# Patient Record
Sex: FEMALE | Race: OTHER
[De-identification: names, ages, dates, MRNs, and addresses within clinical notes are randomized per-mention and may not be internally consistent; named-entity substitution may affect disease eponyms.]

---

## 2018-09-18 ENCOUNTER — HOSPITAL ENCOUNTER (EMERGENCY)
Dept: HOSPITAL 62 - ER | Age: 63
Discharge: HOME | End: 2018-09-18
Payer: COMMERCIAL

## 2018-09-18 VITALS — DIASTOLIC BLOOD PRESSURE: 74 MMHG | SYSTOLIC BLOOD PRESSURE: 142 MMHG

## 2018-09-18 DIAGNOSIS — Z90.49: ICD-10-CM

## 2018-09-18 DIAGNOSIS — Z87.19: ICD-10-CM

## 2018-09-18 DIAGNOSIS — Z87.442: ICD-10-CM

## 2018-09-18 DIAGNOSIS — R19.4: ICD-10-CM

## 2018-09-18 DIAGNOSIS — R31.9: ICD-10-CM

## 2018-09-18 DIAGNOSIS — R11.0: ICD-10-CM

## 2018-09-18 DIAGNOSIS — R10.31: ICD-10-CM

## 2018-09-18 DIAGNOSIS — N39.0: Primary | ICD-10-CM

## 2018-09-18 LAB
ADD MANUAL DIFF: NO
ALBUMIN SERPL-MCNC: 5.2 G/DL (ref 3.5–5)
ALP SERPL-CCNC: 74 U/L (ref 38–126)
ALT SERPL-CCNC: 29 U/L (ref 9–52)
ANION GAP SERPL CALC-SCNC: 10 MMOL/L (ref 5–19)
APPEARANCE UR: CLEAR
APTT PPP: (no result) S
AST SERPL-CCNC: 29 U/L (ref 14–36)
BASOPHILS # BLD AUTO: 0.1 10^3/UL (ref 0–0.2)
BASOPHILS NFR BLD AUTO: 0.9 % (ref 0–2)
BILIRUB DIRECT SERPL-MCNC: 0.4 MG/DL (ref 0–0.4)
BILIRUB SERPL-MCNC: 1.1 MG/DL (ref 0.2–1.3)
BILIRUB UR QL STRIP: NEGATIVE
BUN SERPL-MCNC: 12 MG/DL (ref 7–20)
CALCIUM: 10.5 MG/DL (ref 8.4–10.2)
CHLORIDE SERPL-SCNC: 102 MMOL/L (ref 98–107)
CO2 SERPL-SCNC: 28 MMOL/L (ref 22–30)
EOSINOPHIL # BLD AUTO: 0.1 10^3/UL (ref 0–0.6)
EOSINOPHIL NFR BLD AUTO: 0.9 % (ref 0–6)
ERYTHROCYTE [DISTWIDTH] IN BLOOD BY AUTOMATED COUNT: 13.3 % (ref 11.5–14)
GLUCOSE SERPL-MCNC: 105 MG/DL (ref 75–110)
GLUCOSE UR STRIP-MCNC: NEGATIVE MG/DL
HCT VFR BLD CALC: 44.3 % (ref 36–47)
HGB BLD-MCNC: 14.8 G/DL (ref 12–15.5)
KETONES UR STRIP-MCNC: NEGATIVE MG/DL
LIPASE SERPL-CCNC: 129 U/L (ref 23–300)
LYMPHOCYTES # BLD AUTO: 1.7 10^3/UL (ref 0.5–4.7)
LYMPHOCYTES NFR BLD AUTO: 27.1 % (ref 13–45)
MCH RBC QN AUTO: 28.7 PG (ref 27–33.4)
MCHC RBC AUTO-ENTMCNC: 33.4 G/DL (ref 32–36)
MCV RBC AUTO: 86 FL (ref 80–97)
MONOCYTES # BLD AUTO: 0.3 10^3/UL (ref 0.1–1.4)
MONOCYTES NFR BLD AUTO: 5.2 % (ref 3–13)
NEUTROPHILS # BLD AUTO: 4.1 10^3/UL (ref 1.7–8.2)
NEUTS SEG NFR BLD AUTO: 65.9 % (ref 42–78)
NITRITE UR QL STRIP: NEGATIVE
PH UR STRIP: 6 [PH] (ref 5–9)
PLATELET # BLD: 171 10^3/UL (ref 150–450)
POTASSIUM SERPL-SCNC: 4.7 MMOL/L (ref 3.6–5)
PROT SERPL-MCNC: 8.8 G/DL (ref 6.3–8.2)
PROT UR STRIP-MCNC: NEGATIVE MG/DL
RBC # BLD AUTO: 5.17 10^6/UL (ref 3.72–5.28)
SODIUM SERPL-SCNC: 140.2 MMOL/L (ref 137–145)
SP GR UR STRIP: 1.01
TOTAL CELLS COUNTED % (AUTO): 100 %
UROBILINOGEN UR-MCNC: NEGATIVE MG/DL (ref ?–2)
WBC # BLD AUTO: 6.3 10^3/UL (ref 4–10.5)

## 2018-09-18 PROCEDURE — 74177 CT ABD & PELVIS W/CONTRAST: CPT

## 2018-09-18 PROCEDURE — 96375 TX/PRO/DX INJ NEW DRUG ADDON: CPT

## 2018-09-18 PROCEDURE — 80053 COMPREHEN METABOLIC PANEL: CPT

## 2018-09-18 PROCEDURE — 83690 ASSAY OF LIPASE: CPT

## 2018-09-18 PROCEDURE — 96374 THER/PROPH/DIAG INJ IV PUSH: CPT

## 2018-09-18 PROCEDURE — 85025 COMPLETE CBC W/AUTO DIFF WBC: CPT

## 2018-09-18 PROCEDURE — 96361 HYDRATE IV INFUSION ADD-ON: CPT

## 2018-09-18 PROCEDURE — 81001 URINALYSIS AUTO W/SCOPE: CPT

## 2018-09-18 PROCEDURE — 36415 COLL VENOUS BLD VENIPUNCTURE: CPT

## 2018-09-18 PROCEDURE — 99284 EMERGENCY DEPT VISIT MOD MDM: CPT

## 2018-09-18 NOTE — ER DOCUMENT REPORT
ED Medical Screen (RME)





- General


Chief Complaint: Abdominal Pain


Stated Complaint: ABDOMINAL PAIN,NAUSEA


Time Seen by Provider: 09/18/18 13:54


Notes: 





62 years old female with a history of appendectomy and right-sided 

diverticulitis status post colectomy partial, presents today with right-sided 

abdominal pain since yesterday, particularly over the right lower quadrant with 

nausea no vomiting.  Denied any diarrhea denies any dysuria frequency urgency.  

Denied any fever chills.





On examination right side of the abdomen is extremely tender particularly over 

the right lower quadrant region with guarding and rebound tenderness.  Positive 

bowel sounds


TRAVEL OUTSIDE OF THE [place] IN LAST 30 DAYS: No





- Related Data


Allergies/Adverse Reactions: 


 





No Known Allergies Allergy (Verified 09/18/18 12:18)


 











Past Medical History





- Social History


Chew tobacco use (# tins/day): No


Frequency of alcohol use: Occasional


Drug Abuse: None


Renal/ Medical History: Reports: Hx Kidney Stones.  Denies: Hx Peritoneal 

Dialysis


Past Surgical History: Reports: Hx Appendectomy - Patient was told she had her 

appendix out when she had her right hemicolect, Hx Bowel Surgery, Hx Orthopedic 

Surgery, Hx Tubal Ligation





- Immunizations


Hx Diphtheria, Pertussis, Tetanus Vaccination: Yes





Physical Exam





- Vital signs


Vitals: 





 











Temp Pulse Resp BP Pulse Ox


 


 97.8 F   63   24 H  177/69 H  98 


 


 09/18/18 12:24  09/18/18 12:24  09/18/18 12:24  09/18/18 12:24  09/18/18 12:24














Course





- Vital Signs


Vital signs: 





 











Temp Pulse Resp BP Pulse Ox


 


 97.8 F   63   24 H  177/69 H  98 


 


 09/18/18 12:24  09/18/18 12:24  09/18/18 12:24  09/18/18 12:24  09/18/18 12:24














Doctor's Discharge





- Discharge


Referrals: 


[facility] [Primary Care Provider] - Follow up as needed

## 2018-09-18 NOTE — ER DOCUMENT REPORT
ED General





- General


Chief Complaint: Abdominal Pain


Stated Complaint: ABDOMINAL PAIN,NAUSEA


Time Seen by Provider: 09/18/18 13:54


Notes: 





Patient is a 62-year-old female that presents to the emergency department for 

chief complaint of right lower quadrant abdominal pain.  Patient states she 

started having this pain yesterday and got worse throughout today so she 

decided come to the emergency department.  She does have a history of 

diverticulitis, which she had to have a colon resection for, and was concerned 

that this was flared up again.  She has had nausea but no vomiting.  Denies 

having any fevers, chills, night sweats, chest pain, shortness of breath, 

headache, dysuria or hematuria.  She denies having any diarrhea, but feels that 

she has to have a bowel movement but does not go when she tries.  She currently 

rates her pain as a 1 out of 10, she did receive analgesics prior to my 

evaluation by the triage provider, and she states she is overall feeling much 

better upon my history taking.





Past Medical History: Diverticulitis, otherwise denies chronic medical 

conditions


Past Surgical History: Appendectomy, colon resection


Social History: Denies tobacco, alcohol or drug use


Family History: Reviewed and noncontributory for presenting illness


Allergies: Reviewed, see documented allergy list. 





REVIEW OF SYSTEMS:


Unless otherwise stated in this report the patient's positive and negative 

responses for review of systems for constitutional, eyes, ENT, cardiovascular, 

respiratory, gastrointestinal, neurological, genitourinary, musculoskeletal, 

and integumentary systems and related systems to the presenting problem are 

either as stated in the HPI or were not pertinent or were negative for the 

symptoms and/or complaints related to the presenting medical problem.





PHYSICAL EXAMINATION:





Vital signs reviewed, nursing noted reviewed. 





GENERAL: Well-appearing, well-nourished and in no acute distress.





HEAD: Atraumatic, normocephalic.





EYES: Eyes appear normal, extraocular movements intact, sclera anicteric, 

conjunctiva are normal.





ENT: nares patent, oropharynx clear without exudates.  Moist mucous membranes.





NECK: Normal range of motion, supple without lymphadenopathy





LUNGS: Breath sounds clear to auscultation bilaterally and equal.  No wheezes 

rales or rhonchi.





HEART: Regular rate and rhythm without murmurs





ABDOMEN: Soft, mild right lower quadrant tenderness with palpation, normoactive 

bowel sounds.  No rebound, distention, guarding, or rigidity. No masses 

appreciated.





EXTREMITIES: Nontender, good range of motion, no pitting or edema.  





NEUROLOGICAL: No focal neurological deficits. Moves all extremities 

spontaneously Motor and sensory grossly intact on exam.





PSYCH: Normal mood, normal affect.





SKIN: Warm, Dry, normal turgor, no rashes or lesions noted on exposed skin





TRAVEL OUTSIDE OF THE [place] IN LAST 30 DAYS: No





- Related Data


Allergies/Adverse Reactions: 


 





No Known Allergies Allergy (Verified 09/18/18 12:18)


 











Past Medical History





- Social History


Smoking Status: Never Smoker


Chew tobacco use (# tins/day): No


Frequency of alcohol use: Occasional


Drug Abuse: None


Family History: Reviewed & Not Pertinent


Patient has suicidal ideation: No


Patient has homicidal ideation: No


Renal/ Medical History: Reports: Hx Kidney Stones.  Denies: Hx Peritoneal 

Dialysis


Past Surgical History: Reports: Hx Appendectomy - Patient was told she had her 

appendix out when she had her right hemicolect, Hx Bowel Surgery, Hx Orthopedic 

Surgery, Hx Tubal Ligation





- Immunizations


Hx Diphtheria, Pertussis, Tetanus Vaccination: Yes





Physical Exam





- Vital signs


Vitals: 


 











Temp Pulse Resp BP Pulse Ox


 


 97.8 F   63   24 H  177/69 H  98 


 


 09/18/18 12:24  09/18/18 12:24  09/18/18 12:24  09/18/18 12:24  09/18/18 12:24














Course





- Re-evaluation


Re-evalutation: 





Patient seen and examined vital signs reviewed. 





Laboratory data and imaging were ordered as appropriate for the patient's 

presenting symptoms and complaint, with consideration of any critical or life 

threatening conditions that may be associated with their obtained history and 

exam as noted above.





Patient was treated with IV fluids, and analgesics and anti-emetics





Results were reviewed when available and demonstrated negative CT imaging, 

blood work unremarkable, UA demonstrated materia and trace bacteria





The patient was re-evaluated and was much improved from presentation, pain was 

resolved, no nausea





Evaluation was most consistent with nonspecific abdominal pain, UTI, patient be 

discharged home on Keflex and Bentyl





Results were discussed with the patient at this point, after careful 

consideration I feel that that patient can be discharged from the emergency 

department, the patient was educated treatments and reasons to return to the 

emergency department based on their presumed diagnosis as noted above, they 

were advised to followup with a primary care physician in 2-3 days. Patient was 

agreeable to plan of care.





*Note is created using voice recognition software and may contain spelling, 

syntax or grammatical errors.








Laboratory











  09/18/18 09/18/18 09/18/18





  14:15 14:30 14:30


 


WBC   6.3 


 


RBC   5.17 


 


Hgb   14.8 


 


Hct   44.3 


 


MCV   86 


 


MCH   28.7 


 


MCHC   33.4 


 


RDW   13.3 


 


Plt Count   171 


 


Seg Neutrophils %   65.9 


 


Lymphocytes %   27.1 


 


Monocytes %   5.2 


 


Eosinophils %   0.9 


 


Basophils %   0.9 


 


Absolute Neutrophils   4.1 


 


Absolute Lymphocytes   1.7 


 


Absolute Monocytes   0.3 


 


Absolute Eosinophils   0.1 


 


Absolute Basophils   0.1 


 


Sodium    140.2


 


Potassium    4.7


 


Chloride    102


 


Carbon Dioxide    28


 


Anion Gap    10


 


BUN    12


 


Creatinine    0.74


 


Est GFR ( Amer)    > 60


 


Est GFR (Non-Af Amer)    > 60


 


Glucose    105


 


Calcium    10.5 H


 


Total Bilirubin    1.1


 


Direct Bilirubin    0.4


 


Neonat Total Bilirubin    Not Reportable


 


Neonat Direct Bilirubin    Not Reportable


 


Neonat Indirect Bili    Not Reportable


 


AST    29


 


ALT    29


 


Alkaline Phosphatase    74


 


Total Protein    8.8 H


 


Albumin    5.2 H


 


Lipase    129.0


 


Urine Color  STRAW  


 


Urine Appearance  CLEAR  


 


Urine pH  6.0  


 


Ur Specific Gravity  1.012  


 


Urine Protein  NEGATIVE  


 


Urine Glucose (UA)  NEGATIVE  


 


Urine Ketones  NEGATIVE  


 


Urine Blood  MODERATE H  


 


Urine Nitrite  NEGATIVE  


 


Urine Bilirubin  NEGATIVE  


 


Urine Urobilinogen  NEGATIVE  


 


Ur Leukocyte Esterase  NEGATIVE  


 


Urine WBC (Auto)  0  


 


Urine RBC (Auto)  1  


 


Urine Bacteria (Auto)  TRACE  


 


Squamous Epi Cells Auto  1  


 


Urine Mucus (Auto)  RARE  


 


Urine Ascorbic Acid  NEGATIVE  














 





Abdomen/Pelvis CT  09/18/18 13:54


IMPRESSION:  NO SIGNIFICANT OR ACUTE FINDING IN THE ABDOMEN OR PELVIS ON CT 

SCAN WITH IV CONTRAST.


 

















- Vital Signs


Vital signs: 


 











Temp Pulse Resp BP Pulse Ox


 


 98.1 F   68   20   142/74 H  97 


 


 09/18/18 20:37  09/18/18 20:37  09/18/18 20:37  09/18/18 20:37  09/18/18 20:37














- Laboratory


Result Diagrams: 


 09/18/18 14:30





 09/18/18 14:30


Laboratory results interpreted by me: 


 











  09/18/18 09/18/18





  14:15 14:30


 


Calcium   10.5 H


 


Total Protein   8.8 H


 


Albumin   5.2 H


 


Urine Blood  MODERATE H 














Discharge





- Discharge


Clinical Impression: 


Abdominal pain


Qualifiers:


 Abdominal location: right lower quadrant Qualified Code(s): R10.31 - Right 

lower quadrant pain





UTI (urinary tract infection)


Qualifiers:


 Urinary tract infection type: site unspecified Hematuria presence: with 

hematuria Qualified Code(s): N39.0 - Urinary tract infection, site not specified

; R31.9 - Hematuria, unspecified; R31.9 - Hematuria, unspecified





Condition: Stable


Disposition: HOME, SELF-CARE


Instructions:  Abdominal Pain ([facility])


Additional Instructions: 


Please return to the emergency department if you have any worsening, or concern 

of your symptoms.





Please return to the emergency department if you develop chest pain, difficulty 

breathing, severe abdominal pain, or ongoing vomiting.





Please follow-up with your primary care physician in 2-3 days and any other 

recommended physicians.





If prescribed, take all medications as directed.  





If you have any questions or concerns do not hesitate to return the emergency 

department for evaluation. 


Prescriptions: 


Cephalexin Monohydrate [Keflex 500 mg Capsule] 500 mg PO Q12 5 Days #10 capsule


Dicyclomine HCl [Bentyl 10 mg Capsule] 1 cap PO TID PRN #15 cap


 PRN Reason: Abdominal Cramping


Referrals: 


DARIUSZ KOO MD [ACTIVE STAFF] - Follow up in 3-5 days (or your primary care)

## 2018-09-18 NOTE — RADIOLOGY REPORT (SQ)
EXAM DESCRIPTION:  CT ABD/PELVIS WITH IV   ORAL



COMPLETED DATE/TIME:  9/18/2018 5:07 pm



REASON FOR STUDY:  Abdominal pain, right-sided diverticulitis



COMPARISON:  None.



TECHNIQUE:  CT scan of the abdomen and pelvis performed using helical scanning technique with dynamic
 intravenous contrast injection.  Oral contrast. Images reviewed with lung, soft tissue, and bone win
dows. Reconstructed coronal and sagittal MPR images reviewed. Delayed images for evaluation of the ur
inary system also acquired. All images stored on PACS.

All CT scanners at this facility use dose modulation, iterative reconstruction, and/or weight based d
osing when appropriate to reduce radiation dose to as low as reasonably achievable (ALARA).

[facility]: Dose Right  [facility]: CareDose    [facility]: Dose Right    [facility]: Teradose 4D    [facility]: Smart Technologies



CONTRAST TYPE AND DOSE:  contrast/concentration: Isovue 350.00 mg/ml; Total Contrast Delivered: 62.0 
ml; Total Saline Delivered: 35.0 ml



RENAL FUNCTION:  BUN 12 creatinine 0.74



RADIATION DOSE:  CT Rad equipment meets quality standard of care and radiation dose reduction techniq
ues were employed. CTDIvol: 4.8 - 5.6 mGy. DLP: 513 mGy-cm..



LIMITATIONS:  None.



FINDINGS:  LOWER CHEST: No significant findings. No nodules or infiltrates.

LIVER: Normal size. No masses.  No dilated ducts.

SPLEEN: Normal size. No focal lesions.

PANCREAS: No masses. No significant calcifications. No adjacent inflammation or peripancreatic fluid 
collections. Pancreatic duct not dilated.

GALLBLADDER: No identified stones by CT criteria. No inflammatory changes to suggest cholecystitis.

ADRENAL GLANDS: No significant masses or asymmetry.

RIGHT KIDNEY AND URETER: No solid masses.   No significant calcifications.   No hydronephrosis or hyd
roureter.

LEFT KIDNEY AND URETER: No solid masses.   No significant calcifications.   No hydronephrosis or hydr
oureter.

AORTA AND VESSELS: No aneurysm. No dissection. Renal arteries, SMA, celiac without stenosis.

RETROPERITONEUM: No retroperitoneal adenopathy, hemorrhage or masses.

BOWEL AND PERITONEAL CAVITY: No masses or inflammatory changes. No free fluid or peritoneal masses.

APPENDIX: Surgically absent.

PELVIS: No mass.  No free fluid. Normal bladder.

ABDOMINAL WALL: No masses. No hernias.

BONES: No significant or acute findings.

OTHER: No other significant finding.



IMPRESSION:  NO SIGNIFICANT OR ACUTE FINDING IN THE ABDOMEN OR PELVIS ON CT SCAN WITH IV CONTRAST.



TECHNICAL DOCUMENTATION:  JOB ID:  [id number]

Quality ID # 436: Final reports with documentation of one or more dose reduction techniques (e.g., Au
tomated exposure control, adjustment of the mA and/or kV according to patient size, use of iterative 
reconstruction technique)

 2011 [employer]- All Rights Reserved



Reading location - IP/workstation name: [email or web address]

## 2020-06-18 ENCOUNTER — HOSPITAL ENCOUNTER (OUTPATIENT)
Dept: HOSPITAL 62 - OROUT | Age: 65
Discharge: HOME | End: 2020-06-18
Attending: INTERNAL MEDICINE
Payer: MEDICARE

## 2020-06-18 VITALS — DIASTOLIC BLOOD PRESSURE: 64 MMHG | SYSTOLIC BLOOD PRESSURE: 154 MMHG

## 2020-06-18 DIAGNOSIS — Z03.818: ICD-10-CM

## 2020-06-18 DIAGNOSIS — K29.50: ICD-10-CM

## 2020-06-18 DIAGNOSIS — K64.8: ICD-10-CM

## 2020-06-18 DIAGNOSIS — K44.9: ICD-10-CM

## 2020-06-18 DIAGNOSIS — Z86.010: ICD-10-CM

## 2020-06-18 DIAGNOSIS — K57.30: ICD-10-CM

## 2020-06-18 DIAGNOSIS — Z12.11: Primary | ICD-10-CM

## 2020-06-18 DIAGNOSIS — K21.9: ICD-10-CM

## 2020-06-18 PROCEDURE — 43239 EGD BIOPSY SINGLE/MULTIPLE: CPT

## 2020-06-18 PROCEDURE — 88305 TISSUE EXAM BY PATHOLOGIST: CPT

## 2020-06-18 PROCEDURE — 45380 COLONOSCOPY AND BIOPSY: CPT

## 2020-06-18 PROCEDURE — 87635 SARS-COV-2 COVID-19 AMP PRB: CPT

## 2020-06-18 PROCEDURE — C9803 HOPD COVID-19 SPEC COLLECT: HCPCS

## 2020-06-18 PROCEDURE — 00813 ANES UPR LWR GI NDSC PX: CPT

## 2020-06-18 NOTE — OPERATIVE REPORT
Operative Report


DATE OF SURGERY: 06/18/20


Operative Report: 





The risk, benefits and alternatives of the procedure including the risk of 

bleeding, perforation requiring surgery have been explained to the patient in 

detail and informed consent has been obtained.  Patient is placed in a left, 

lateral decubital position.  Timeout was called.  Propofol medication is 

administered.  Rectal examination is done which did not reveal any masses, tears

or fissures.  An Olympus videoscope was introduced into the patient's rectum.  

Scope was then carefully advanced all the way to the cecum.  Cecum was 

identified by the usual anatomical landmarks including the ileocecal valve as 

well as the appendiceal office.  Photodocumentation is obtained.  Scope was then

sequentially pulled back via the various segments of the colon including the 

ascending colon, back flexure, transverse colon, splenic flexure, descending 

colon finding to the rectosigmoid portions of the colon.  Retroflexion maneuver 

is performed.


The risks benefits and alternatives of the procedure explained to the patient in

detail and informed consent is obtained.A  GIF Olympus video scope was inserted 

into the patient's mouth and hypopharynx, the esophagus is identified intubated 

and insufflated, the scope was then advanced through the esophagus stomach and 

duodenum, retroflexion maneuver is done the esophagus stomach and first and 

second portions of the duodenum examined


PREOPERATIVE DIAGNOSIS: Personal history of polyp.  Gastroesophageal reflux 

disease


POSTOPERATIVE DIAGNOSIS: Anastomosis noted on the right side status post right 

hemicolectomy status post biopsy at the anastomotic site.  Diverticulosis 

without any evidence of diverticulitis.  Internal hemorrhoids.  Gastritis status

post biopsy


OPERATION: Colonoscopy with biopsy.  EGD with biopsy


SURGEON: ARASELI HOWARD


ANESTHESIA: LMAC


TISSUE REMOVED OR ALTERED: As noted above.


COMPLICATIONS: 





None.


ESTIMATED BLOOD LOSS: None.


INTRAOPERATIVE FINDINGS: As noted above.


PROCEDURE: 





Patient tolerated the procedure well.


No immediate postprocedure complications are noted.


Patient is discharged in good condition.


Discharge date 6/18/2020.


Discharge diet: Regular.


Discharge activity: Regular.


2 to 3-week follow-up to discuss findings.


Patient is instructed call the office or proceed to the emergency room should 

there be any further problems or questions.


Wait on the pathology.  5-year surveillance colonoscopy.

## 2020-07-01 ENCOUNTER — HOSPITAL ENCOUNTER (OUTPATIENT)
Dept: HOSPITAL 62 - WI | Age: 65
End: 2020-07-01
Attending: FAMILY MEDICINE
Payer: MEDICARE

## 2020-07-01 DIAGNOSIS — Z12.31: Primary | ICD-10-CM

## 2020-07-01 DIAGNOSIS — N64.89: ICD-10-CM

## 2020-07-01 PROCEDURE — 77063 BREAST TOMOSYNTHESIS BI: CPT

## 2020-07-01 PROCEDURE — 77067 SCR MAMMO BI INCL CAD: CPT

## 2020-07-01 NOTE — WOMENS IMAGING REPORT
EXAM DESCRIPTION:  3D SCREENING MAMMO BILAT



IMAGES COMPLETED DATE/TIME:  7/1/2020 7:42 am



REASON FOR STUDY:  ROUTINE BILATERAL SCREENING;Z12.31 Z12.31  ENCNTR SCREEN MAMMOGRAM FOR MALIGNANT N
EOPLASM OF LAURA



COMPARISON:  None.



EXAM PARAMETERS:  Standard craniocaudal and mediolateral oblique views of each breast recorded using 
digital acquisition and breast tomosynthesis.

Read with the assistance of CAD.

.Count includes the [facility]  Version 9.2



LIMITATIONS:  None.



FINDINGS:  RIGHT BREAST

MASSES: No suspicious masses.

CALCIFICATIONS: No new or suspicious calcifications.

ARCHITECTURAL DISTORTION: None.

ASYMMETRY: None noted.

OTHER: No other significant findings.

LEFT BREAST

MASSES: No suspicious masses.

CALCIFICATIONS: No new or suspicious calcifications.

ARCHITECTURAL DISTORTION: None.

ASYMMETRY: 7 cm from the nipple in the upper half left breast, on the MLO view only, an asymmetry is 
present for which left breast 90 mediolateral whole breast tomosynthesis, left breast MLO cone compr
ession tomosynthesis and ultrasound are recommended for followup.

OTHER: No other significant findings.



IMPRESSION:  No mammographic evidence for malignancy right breast.

Asymmetry left MLO view only, 4 2 additional cone compression tomosynthesis, left breast 90 mediolat
eral tomosynthesis and ultrasound are recommended

0 Incomplete: Needs Additional Imaging Evaluation and/or prior Mammograms for Comparison.



BREAST DENSITY:  b. There are scattered areas of fibroglandular density.



BIRAD:  ASSESSMENT:  0 Incomplete:  Needs Additional Imaging Evaluation and/or prior Mammograms for C
omparison.



RECOMMENDATION:  RECOMMENDED FOLLOW-UP: Additional left breast diagnostic mammograms and ultrasound

The patient will be contacted for additional imaging.



COMMENT:  The patient has been notified of the results by letter per [facility] requirements. Additional no
tification policies are in place for contacting patient with suspicious or incomplete findings.

Quality ID #225: The American College of Radiology recommends an annual screening mammogram for women
 aged 40 years or over. This facility utilizes a reminder system to ensure that all patients receive 
reminder letters, and/or direct phone calls for appointments. This includes reminders for routine scr
eening mammograms, diagnostic mammograms, or other Breast Imaging Interventions when appropriate.  Th
is patient will be placed in the appropriate reminder system.



TECHNICAL DOCUMENTATION:  FINDING NUMBER: (1)

ASSESSMENT: (1)

JOB ID:  [id number]

 "[employer]- All Rights Reserved



Reading location - IP/workstation name: [email or web address]

## 2020-07-06 ENCOUNTER — HOSPITAL ENCOUNTER (OUTPATIENT)
Dept: HOSPITAL 62 - WI | Age: 65
End: 2020-07-06
Attending: FAMILY MEDICINE
Payer: MEDICARE

## 2020-07-06 DIAGNOSIS — R01.1: ICD-10-CM

## 2020-07-06 DIAGNOSIS — M81.0: Primary | ICD-10-CM

## 2020-07-06 PROCEDURE — 93306 TTE W/DOPPLER COMPLETE: CPT

## 2020-07-06 PROCEDURE — 77080 DXA BONE DENSITY AXIAL: CPT

## 2020-07-06 NOTE — XCELERA REPORT
[address] [address]

                               Tel: [phone]

                               Fax: [phone]



                      Transthoracic Echocardiogram Report

_______________________________________________________________________________



Name: ROSHAN CALLAHAN

MRN: [id number]                                Age: 64 yrs

Gender: Female                                 : 1955

Patient Status: Outpatient                     Patient Location: [place]

Account #: [id number]

Study Date: 2020 02:37 PM

Accession #: [id number]

_______________________________________________________________________________



Height: 61 in        Weight: 127 lb        BSA: 1.6 m2

_______________________________________________________________________________



Reason For Study: CARDIAC MURMUR





Ordering Physician: MOISES MONIQUE

Performed By: Maty Woods



_______________________________________________________________________________



Interpretation Summary

Minimal posterior pericardial effusion.

Mild nonstenotic calcific aortic valve disease, 3 cusps, no aortic stenosis,

trace aortic regurgitation, no left ventricular enlargement.

Mild mitral annular calcification. No MS, no MVP, mild MR with no left atrial

enlargement.

No LVH, LVEF 60-65%, no LV diastolic dysfunction, mild IVS, anterior wall

hypokinesis, no left ventricular enlargement.

Normal tricuspid valve, mild regurgitation, no pulmonary hypertension, RVSP 24

mmHg, RAP 3 mmHg, normal right heart size, normal right heart function. IVC

normal.



Summary of findings = mild nonstenotic calcific aortic valvular disease with

trace AR, no LV enlargement. Mild physiological MR no left atrial enlargement.

Normal LVEF, no LV diastolic dysfunction, mild IVS and anterior wall

hypokinesis. Normal right heart, no pulmonary hypertension.



MMode/2D Measurements & Calculations

RVDd: 2.9 cm        LVIDd: 4.2 cm      FS: 38.3 %         Ao root diam: 2.4 cm

IVSd: 0.99 cm       LVIDs: 2.6 cm      EDV(Teich):        Ao root area:

                    LVPWd: 1.00 cm     79.7 ml

                                       ESV(Teich):        4.6 cm2

                                       24.8 ml            LA dimension: 3.6 cm



                                       EF(Teich): 68.9 %

        _______________________________________________________________

LVLd ap4: 7.5 cm    SV(MOD-sp4):

EDV(MOD-sp4):       45.0 ml

71.0 ml

LVLs ap4: 6.3 cm

ESV(MOD-sp4):

26.0 ml

EF(MOD-sp4): 63.4 %



Doppler Measurements & Calculations

MV E max fan:      MV P1/2t max fan:     Ao V2 max:          LV V1 max P.8 cm/sec        59.8 cm/sec           125.6 cm/sec        2.5 mmHg

MV A max fan:      MV P1/2t: 94.5 msec   Ao max P.3 mmHg LV V1 max:

65.2 cm/sec                                                  79.4 cm/sec

MV E/A: 0.92       MVA(P1/2t): 2.3 cm2

                   MV dec slope:

                   185.3 cm/sec2

                   MV dec time: 0.32 sec



        _______________________________________________________________

PA V2 max:         TR max fan:           MV P1/2t-pr_phl:

82.4 cm/sec        227.0 cm/sec          94.5 msec

PA max PG:         TR max P.6 mmHg

2.7 mmHg







I      WMSI = 1.50     % Normal = 50



















                                                            Segments  Size

X - Cannot  1 - Normal  2 -         3 - Akinetic4 -         1-2     small

Interpret               Hypokinetic             Dyskinetic  3-5     moderate

5 -                                                         6-14    large

Aneurysmal                                                  15-16   diffuse







_______________________________________________________________________________



_______________________________________________________________________________

Electronically signed by:      López Marks      on 2020 06:36 PM



CC: MOISES MONIQUE Andre

## 2020-07-10 ENCOUNTER — HOSPITAL ENCOUNTER (OUTPATIENT)
Dept: HOSPITAL 62 - WI | Age: 65
End: 2020-07-10
Attending: FAMILY MEDICINE
Payer: MEDICARE

## 2020-07-10 DIAGNOSIS — R92.2: Primary | ICD-10-CM

## 2020-07-10 PROCEDURE — 76642 ULTRASOUND BREAST LIMITED: CPT

## 2020-07-10 PROCEDURE — 77065 DX MAMMO INCL CAD UNI: CPT

## 2020-07-10 NOTE — WOMENS IMAGING REPORT
EXAM DESCRIPTION:  LEFT DIAGNOSTIC MAMMO W/CAD; U/S BREAST UNILAT LIMITED



IMAGES COMPLETED DATE/TIME:  7/10/2020 8:45 am; 7/10/2020 9:05 am



REASON FOR STUDY:  R92.2 INCONCLUSIVE MAMMOGRAM; R92.2 R92.2  INCONCLUSIVE MAMMOGRAM



COMPARISON:  7/1/2020



EXAM PARAMETERS:  X CC and true lateral.  Spot compression MLO and XCC views.  Targeted breast ultras
ound.



LIMITATIONS:  None.



FINDINGS:  BREAST LATERALITY: Left

MASSES: No suspicious masses.

CALCIFICATIONS: No new or suspicious calcifications.

ARCHITECTURAL DISTORTION: None.

ASYMMETRY: The asymmetry noted on recent screening mammography is less conspicuous on XCC and true la
teral imaging.  Persistent density here on MLO compression images.  See ultrasound.

OTHER: No other significant findings.

Ultrasound:  Scanning is performed in the upper outer quadrant from approximately 12 through 3 o'cloc
k.  There is dense echogenic tissue without mass or distortion.



IMPRESSION:  No suspicious findings in the left breast.  He will dense fibroglandular tissue explains
 the recent mammographic abnormality.



BREAST DENSITY:  c. The breasts are heterogeneously dense, which may obscure small masses.



BIRAD:  ASSESSMENT:  2 Benign findings.



RECOMMENDATION:  RECOMMENDED FOLLOW UP: Birads 1 or 2: The patient should resume routine screening .

SPECIFIC INTERVENTION/IMAGING/CONSULTATION RECOMMENDED:No additional intervention/ imaging/consultati
on needed at this time.

COMMUNICATION:No significant abnormalities to discuss with the patient today.



COMMENT:  The patient has been notified of the results by letter per MQSA requirements. Additional no
tification policies are in place for contacting patient with suspicious or incomplete findings.

Quality ID #225: The American [employer] recommends an annual screening mammogram for women
 aged 40 years or over. This facility utilizes a reminder system to ensure that all patients receive 
reminder letters, and/or direct phone calls for appointments. This includes reminders for routine scr
eening mammograms, diagnostic mammograms, or other Breast Imaging Interventions when appropriate.  Th
is patient will be placed in the appropriate reminder system.



TECHNICAL DOCUMENTATION:  FINDING NUMBER: (1)

ASSESSMENT: (1)

JOB ID:  [id number]

 2011 [employer]- All Rights Reserved



Reading location - IP/workstation name: [email or web address]

## 2020-07-10 NOTE — WOMENS IMAGING REPORT
EXAM DESCRIPTION:  LEFT DIAGNOSTIC MAMMO W/CAD; U/S BREAST UNILAT LIMITED



IMAGES COMPLETED DATE/TIME:  7/10/2020 8:45 am; 7/10/2020 9:05 am



REASON FOR STUDY:  R92.2 INCONCLUSIVE MAMMOGRAM; R92.2 R92.2  INCONCLUSIVE MAMMOGRAM



COMPARISON:  7/1/2020



EXAM PARAMETERS:  X CC and true lateral.  Spot compression MLO and XCC views.  Targeted breast ultras
ound.



LIMITATIONS:  None.



FINDINGS:  BREAST LATERALITY: Left

MASSES: No suspicious masses.

CALCIFICATIONS: No new or suspicious calcifications.

ARCHITECTURAL DISTORTION: None.

ASYMMETRY: The asymmetry noted on recent screening mammography is less conspicuous on XCC and true la
teral imaging.  Persistent density here on MLO compression images.  See ultrasound.

OTHER: No other significant findings.

Ultrasound:  Scanning is performed in the upper outer quadrant from approximately 12 through 3 o'cloc
k.  There is dense echogenic tissue without mass or distortion.



IMPRESSION:  No suspicious findings in the left breast.  He will dense fibroglandular tissue explains
 the recent mammographic abnormality.



BREAST DENSITY:  c. The breasts are heterogeneously dense, which may obscure small masses.



BIRAD:  ASSESSMENT:  2 Benign findings.



RECOMMENDATION:  RECOMMENDED FOLLOW UP: Birads 1 or 2: The patient should resume routine screening .

SPECIFIC INTERVENTION/IMAGING/CONSULTATION RECOMMENDED:No additional intervention/ imaging/consultati
on needed at this time.

COMMUNICATION:No significant abnormalities to discuss with the patient today.



COMMENT:  The patient has been notified of the results by letter per MQSA requirements. Additional no
tification policies are in place for contacting patient with suspicious or incomplete findings.

Quality ID #225: The American College of [employer] recommends an annual screening mammogram for women
 aged 40 years or over. This facility utilizes a reminder system to ensure that all patients receive 
reminder letters, and/or direct phone calls for appointments. This includes reminders for routine scr
eening mammograms, diagnostic mammograms, or other Breast Imaging Interventions when appropriate.  Th
is patient will be placed in the appropriate reminder system.



TECHNICAL DOCUMENTATION:  FINDING NUMBER: (1)

ASSESSMENT: (1)

JOB ID:  [id number]

 2011 [employer]- All Rights Reserved



Reading location - IP/workstation name: [email or web address]

## 2020-10-06 ENCOUNTER — HOSPITAL ENCOUNTER (OUTPATIENT)
Dept: HOSPITAL 62 - OD | Age: 65
End: 2020-10-06
Attending: FAMILY MEDICINE
Payer: MEDICARE

## 2020-10-06 DIAGNOSIS — M51.17: Primary | ICD-10-CM

## 2020-10-06 PROCEDURE — 72110 X-RAY EXAM L-2 SPINE 4/>VWS: CPT

## 2020-10-06 NOTE — RADIOLOGY REPORT (SQ)
EXAM DESCRIPTION:  LUMBAR SPINE COMPLETE



IMAGES COMPLETED DATE/TIME:  10/6/2020 9:56 am



REASON FOR STUDY:  RADICULOPATHY, LUMBAR REGION M54.16  RADICULOPATHY, LUMBAR REGION



COMPARISON:  None.



NUMBER OF VIEWS:  Five views including obliques.



TECHNIQUE:  AP, lateral, oblique, and sacral radiographic images acquired of the lumbar spine.



LIMITATIONS:  None.



FINDINGS:  MINERALIZATION: Normal.

SEGMENTATION: Normal.  No transitional anatomy.

ALIGNMENT: Normal.

VERTEBRAE: Maintained height.  No fracture or worrisome bone lesion.

DISCS:  Mild disc space narrowing L5-S 1.  Minimal to very small anterior osteophytes.  Degenerative 
changes visualized lower thoracic spine.

POSTERIOR ELEMENTS:  Facet arthrosis lower lumbar spine.  Pedicles are intact.  No pars defect or pos
terior arch defects.

HARDWARE: None in the spine.

PARASPINAL SOFT TISSUES: Normal.

PELVIS: Intact as visualized. No fractures or worrisome bone lesions. SI joints intact.

OTHER:  Fine wire surgical sutures mid right lower quadrant of the abdomen.



IMPRESSION:  1.  Degenerative facet arthrosis lower lumbar spine and mild disc disease L5-S1.

2.  No acute osseous findings.



TECHNICAL DOCUMENTATION:  JOB ID:  [id number]

 2011 [employer]- All Rights Reserved



Reading location - IP/workstation name: [email or web address]

## 2024-03-24 NOTE — WOMENS IMAGING REPORT
EXAM DESCRIPTION:  BONE DENSITY HIP/SPINE



IMAGES COMPLETED DATE/TIME:  7/6/2020 12:42 pm



REASON FOR STUDY:  M81.0 AGE-RELATED OSTEOPOROSIS W/O CURRENT PATHOLOGICAL FRACTURE M81.0  AGE-RELATE
D OSTEOPOROSIS W/O CURRENT PATHOLOGICAL FRAC R01.1  CARDIAC MURMUR, UNSPECIFIED



COMPARISON:   None.



TECHNIQUE:  Dual-Energy X-ray Absorptiometry (DEXA) of the AP Spine and Hip.



LIMITATIONS:  None.



FINDINGS:  LUMBAR SPINE:

The bone mineral density (BMD) measured from L1-L4 in the AP projection correlates with a T-score of 
-0.9, which is normal as defined by the World Health Organization.

BMD Change vs Baseline:  N/A

HIP:

The bone mineral density (BMD) measured in the left hip correlates with a T-score of 0.1, which is no
rmal as defined by the World Health Organization.

BMD Change vs Baseline:  N/A

10 year Fracture Risk Assessment:

Major Osteoporotic Fracture:  Not available.

Hip Fracture:  Not available.



IMPRESSION:  1. LUMBAR SPINE WHO CLASSIFICATION: Normal

2. HIP WHO CLASSIFICATION: Normal

OVERALL ASSESSMENT:

WHO CLASSIFICATION:  Normal



COMMENT:  The World Health Organization defines low BMD as follows:

T-score:

Normal: At or above -1.0

Osteopenia: Between -1.0 and -2.5

Osteoporosis: At or below -2.5 without fractures

Established osteoporosis: At or below -2.5 with fractures

In general, you may wish to consider:

Diagnosis          Treatment                     Follow-up DEXA

Normal BMD      Prevention                    2-3 years

Osteopenia       Prevention/Therapy        1-2 years

Osteoporosis     Therapy                        Yearly



TECHNICAL DOCUMENTATION:  JOB ID:  [id number]

 2011 [employer]- All Rights Reserved



Reading location - IP/workstation name: [email or web address]
Normal